# Patient Record
Sex: MALE | Race: OTHER | Employment: STUDENT | URBAN - METROPOLITAN AREA
[De-identification: names, ages, dates, MRNs, and addresses within clinical notes are randomized per-mention and may not be internally consistent; named-entity substitution may affect disease eponyms.]

---
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
## 2020-02-03 ENCOUNTER — OFFICE VISIT (OUTPATIENT)
Dept: FAMILY MEDICINE CLINIC | Facility: CLINIC | Age: 20
End: 2020-02-03
Payer: MEDICAID

## 2020-02-03 VITALS
BODY MASS INDEX: 33.12 KG/M2 | WEIGHT: 194 LBS | HEIGHT: 64 IN | DIASTOLIC BLOOD PRESSURE: 80 MMHG | HEART RATE: 75 BPM | TEMPERATURE: 98 F | SYSTOLIC BLOOD PRESSURE: 120 MMHG

## 2020-02-03 DIAGNOSIS — Z23 NEED FOR VACCINATION: ICD-10-CM

## 2020-02-03 DIAGNOSIS — F41.9 ANXIETY: ICD-10-CM

## 2020-02-03 DIAGNOSIS — J30.1 ALLERGIC RHINITIS DUE TO POLLEN, UNSPECIFIED SEASONALITY: ICD-10-CM

## 2020-02-03 DIAGNOSIS — Z00.00 ADULT GENERAL MEDICAL EXAM: Primary | ICD-10-CM

## 2020-02-03 PROCEDURE — 90686 IIV4 VACC NO PRSV 0.5 ML IM: CPT | Performed by: FAMILY MEDICINE

## 2020-02-03 PROCEDURE — 90471 IMMUNIZATION ADMIN: CPT | Performed by: FAMILY MEDICINE

## 2020-02-03 PROCEDURE — 99395 PREV VISIT EST AGE 18-39: CPT | Performed by: FAMILY MEDICINE

## 2020-02-03 RX ORDER — FLUTICASONE PROPIONATE 50 MCG
SPRAY, SUSPENSION (ML) NASAL
Qty: 3 BOTTLE | Refills: 1 | Status: SHIPPED | OUTPATIENT
Start: 2020-02-03 | End: 2020-06-12

## 2020-02-03 NOTE — PROGRESS NOTES
Patient ID: Candice Bueno. is a 23year old male. HPI  Patient presents with:  Routine Physical    Pt is single and does not smoke. He currently works as a plumbing apprentice. He regularly works out at [employer].     He finished his ph ALB 4.0 03/10/2012    GLOBULIN 3.1 03/10/2012    AGRATIO 1.3 03/10/2012     03/10/2012    K 4.5 03/10/2012     03/10/2012    CO2 25 03/10/2012       Lab Results   Component Value Date     (H) 03/10/2012    BUN 7 (L) 03/10/2012    AYLEEN stool.   Genitourinary: Negative for hematuria. Skin: Negative for color change. Allergic/Immunologic: Positive for environmental allergies. Neurological: Negative for speech difficulty and weakness.    Psychiatric/Behavioral: Negative for dysphoric m member of club or organization: Not on file        Attends meetings of clubs or organizations: Not on file        Relationship status: Not on file      Intimate partner violence:        Fear of current or ex partner: Not on file        Emotionally abused: Psychiatric: He has a normal mood and affect. Vitals reviewed. Blood pressure 138/76, pulse 75, temperature 97.8 °F (36.6 °C), temperature source Oral, height 5' 4\" (1.626 m), weight 194 lb (88 kg).    02/03/20  1409 02/03/20  1427   BP: 138/76 12

## 2020-02-06 ENCOUNTER — LAB ENCOUNTER (OUTPATIENT)
Dept: LAB | Age: 20
End: 2020-02-06
Attending: FAMILY MEDICINE
Payer: MEDICAID

## 2020-02-06 DIAGNOSIS — Z00.00 ADULT GENERAL MEDICAL EXAM: ICD-10-CM

## 2020-02-06 LAB
ALBUMIN SERPL-MCNC: 4.2 G/DL (ref 3.4–5)
ALBUMIN/GLOB SERPL: 1.4 {RATIO} (ref 1–2)
ALP LIVER SERPL-CCNC: 78 U/L (ref 45–117)
ALT SERPL-CCNC: 31 U/L (ref 16–61)
ANION GAP SERPL CALC-SCNC: 7 MMOL/L (ref 0–18)
AST SERPL-CCNC: 17 U/L (ref 15–37)
BASOPHILS # BLD AUTO: 0.03 X10(3) UL (ref 0–0.2)
BASOPHILS NFR BLD AUTO: 0.5 %
BILIRUB SERPL-MCNC: 0.5 MG/DL (ref 0.1–2)
BUN BLD-MCNC: 14 MG/DL (ref 7–18)
BUN/CREAT SERPL: 13.5 (ref 10–20)
CALCIUM BLD-MCNC: 9.1 MG/DL (ref 8.5–10.1)
CHLORIDE SERPL-SCNC: 107 MMOL/L (ref 98–112)
CHOLEST SMN-MCNC: 155 MG/DL (ref ?–200)
CO2 SERPL-SCNC: 26 MMOL/L (ref 21–32)
CREAT BLD-MCNC: 1.04 MG/DL (ref 0.7–1.3)
DEPRECATED RDW RBC AUTO: 41.3 FL (ref 35.1–46.3)
EOSINOPHIL # BLD AUTO: 0.12 X10(3) UL (ref 0–0.7)
EOSINOPHIL NFR BLD AUTO: 2.1 %
ERYTHROCYTE [DISTWIDTH] IN BLOOD BY AUTOMATED COUNT: 12.7 % (ref 11–15)
GLOBULIN PLAS-MCNC: 3.1 G/DL (ref 2.8–4.4)
GLUCOSE BLD-MCNC: 88 MG/DL (ref 70–99)
HCT VFR BLD AUTO: 48.4 % (ref 39–53)
HDLC SERPL-MCNC: 52 MG/DL (ref 40–59)
HGB BLD-MCNC: 16.3 G/DL (ref 13–17.5)
IMM GRANULOCYTES # BLD AUTO: 0.01 X10(3) UL (ref 0–1)
IMM GRANULOCYTES NFR BLD: 0.2 %
LDLC SERPL CALC-MCNC: 94 MG/DL (ref ?–100)
LYMPHOCYTES # BLD AUTO: 1.47 X10(3) UL (ref 1.5–5)
LYMPHOCYTES NFR BLD AUTO: 26.2 %
M PROTEIN MFR SERPL ELPH: 7.3 G/DL (ref 6.4–8.2)
MCH RBC QN AUTO: 29.9 PG (ref 26–34)
MCHC RBC AUTO-ENTMCNC: 33.7 G/DL (ref 31–37)
MCV RBC AUTO: 88.6 FL (ref 80–100)
MONOCYTES # BLD AUTO: 0.44 X10(3) UL (ref 0.1–1)
MONOCYTES NFR BLD AUTO: 7.8 %
NEUTROPHILS # BLD AUTO: 3.55 X10 (3) UL (ref 1.5–7.7)
NEUTROPHILS # BLD AUTO: 3.55 X10(3) UL (ref 1.5–7.7)
NEUTROPHILS NFR BLD AUTO: 63.2 %
NONHDLC SERPL-MCNC: 103 MG/DL (ref ?–130)
OSMOLALITY SERPL CALC.SUM OF ELEC: 290 MOSM/KG (ref 275–295)
PATIENT FASTING Y/N/NP: YES
PATIENT FASTING Y/N/NP: YES
PLATELET # BLD AUTO: 212 10(3)UL (ref 150–450)
POTASSIUM SERPL-SCNC: 4.2 MMOL/L (ref 3.5–5.1)
RBC # BLD AUTO: 5.46 X10(6)UL (ref 4.3–5.7)
SODIUM SERPL-SCNC: 140 MMOL/L (ref 136–145)
TRIGL SERPL-MCNC: 46 MG/DL (ref 30–149)
TSI SER-ACNC: 2.92 MIU/ML (ref 0.36–3.74)
VLDLC SERPL CALC-MCNC: 9 MG/DL (ref 0–30)
WBC # BLD AUTO: 5.6 X10(3) UL (ref 4–11)

## 2020-02-06 PROCEDURE — 80053 COMPREHEN METABOLIC PANEL: CPT

## 2020-02-06 PROCEDURE — 80061 LIPID PANEL: CPT

## 2020-02-06 PROCEDURE — 84443 ASSAY THYROID STIM HORMONE: CPT

## 2020-02-06 PROCEDURE — 36415 COLL VENOUS BLD VENIPUNCTURE: CPT

## 2020-02-06 PROCEDURE — 85025 COMPLETE CBC W/AUTO DIFF WBC: CPT

## 2020-03-21 ENCOUNTER — TELEPHONE (OUTPATIENT)
Dept: INTERNAL MEDICINE CLINIC | Facility: CLINIC | Age: 20
End: 2020-03-21

## 2020-03-21 NOTE — TELEPHONE ENCOUNTER
Spoke with Ambar from [facility] and was informed of Dr Lauren Stockton message, she stated she will send ref request to pt psychiatrist office.

## 2020-03-21 NOTE — TELEPHONE ENCOUNTER
This is a medicine that has been written by psychiatry. Why are the psychiatrist not refilling the patient's medications?

## 2020-03-23 NOTE — TELEPHONE ENCOUNTER
Current Outpatient Medications   • Citalopram Hydrobromide (CELEXA) 20 MG Oral Tab Take 20 mg by mouth nightly.     0

## 2020-03-24 RX ORDER — CITALOPRAM 20 MG/1
20 TABLET ORAL NIGHTLY
Qty: 90 TABLET | Refills: 1 | OUTPATIENT
Start: 2020-03-24

## 2020-03-24 NOTE — TELEPHONE ENCOUNTER
Per the patient the psychiatrist is refilling. He will call back if needed and schedule the appointment.

## 2020-03-24 NOTE — TELEPHONE ENCOUNTER
I stated on March 21, 2020 that this is a medication that was written by psychiatry. Why are we now being asked to refill this medication?   If for some reason it has to do with COVID-19 on the psychiatric office is closed or the psychiatrists are not doin

## 2020-04-09 DIAGNOSIS — H92.01 RIGHT EAR PAIN: ICD-10-CM

## 2020-04-09 DIAGNOSIS — H60.501 ACUTE OTITIS EXTERNA OF RIGHT EAR, UNSPECIFIED TYPE: ICD-10-CM

## 2020-04-09 RX ORDER — IBUPROFEN 600 MG/1
TABLET ORAL
Qty: 30 TABLET | Refills: 0 | Status: SHIPPED | OUTPATIENT
Start: 2020-04-09 | End: 2020-04-16

## 2020-04-10 DIAGNOSIS — J30.1 ALLERGIC RHINITIS DUE TO POLLEN: ICD-10-CM

## 2020-04-10 DIAGNOSIS — J30.1 ALLERGIC RHINITIS DUE TO POLLEN, UNSPECIFIED SEASONALITY: ICD-10-CM

## 2020-04-10 DIAGNOSIS — H91.93 HEARING DECREASED, BILATERAL: ICD-10-CM

## 2020-04-10 DIAGNOSIS — J34.3 HYPERTROPHY, NASAL, TURBINATE: ICD-10-CM

## 2020-04-10 RX ORDER — MONTELUKAST SODIUM 10 MG/1
TABLET ORAL
Qty: 90 TABLET | Refills: 1 | OUTPATIENT
Start: 2020-04-10

## 2020-04-16 DIAGNOSIS — H60.501 ACUTE OTITIS EXTERNA OF RIGHT EAR, UNSPECIFIED TYPE: ICD-10-CM

## 2020-04-16 DIAGNOSIS — H92.01 RIGHT EAR PAIN: ICD-10-CM

## 2020-04-16 RX ORDER — IBUPROFEN 600 MG/1
TABLET ORAL
Qty: 30 TABLET | Refills: 0 | Status: SHIPPED | OUTPATIENT
Start: 2020-04-16

## 2020-06-12 ENCOUNTER — TELEPHONE (OUTPATIENT)
Dept: FAMILY MEDICINE CLINIC | Facility: CLINIC | Age: 20
End: 2020-06-12

## 2020-06-12 DIAGNOSIS — J30.1 ALLERGIC RHINITIS DUE TO POLLEN, UNSPECIFIED SEASONALITY: ICD-10-CM

## 2020-06-12 RX ORDER — FLUTICASONE PROPIONATE 50 MCG
SPRAY, SUSPENSION (ML) NASAL
Qty: 3 BOTTLE | Refills: 1 | Status: SHIPPED | OUTPATIENT
Start: 2020-06-12 | End: 2022-09-27

## 2021-09-22 NOTE — PROGRESS NOTES
Diagnosis: Rupture of anterior cruciate ligament of right knee, initial encounter (J75.163M)  Posterolateral complex injury, right, initial encounter (N87.67WC)    Date of Onset / surgery: 12/23/2017        # of Visits:  6 MEDICAID         Next MD visit: 2
no

## 2021-11-09 ENCOUNTER — OFFICE VISIT (OUTPATIENT)
Dept: FAMILY MEDICINE CLINIC | Facility: CLINIC | Age: 21
End: 2021-11-09
Payer: COMMERCIAL

## 2021-11-09 VITALS
DIASTOLIC BLOOD PRESSURE: 83 MMHG | WEIGHT: 240 LBS | BODY MASS INDEX: 40.97 KG/M2 | HEART RATE: 67 BPM | SYSTOLIC BLOOD PRESSURE: 134 MMHG | HEIGHT: 64 IN

## 2021-11-09 DIAGNOSIS — B07.0 PLANTAR WARTS: Primary | ICD-10-CM

## 2021-11-09 PROCEDURE — 3079F DIAST BP 80-89 MM HG: CPT | Performed by: NURSE PRACTITIONER

## 2021-11-09 PROCEDURE — 3008F BODY MASS INDEX DOCD: CPT | Performed by: NURSE PRACTITIONER

## 2021-11-09 PROCEDURE — 99213 OFFICE O/P EST LOW 20 MIN: CPT | Performed by: NURSE PRACTITIONER

## 2021-11-09 PROCEDURE — 3075F SYST BP GE 130 - 139MM HG: CPT | Performed by: NURSE PRACTITIONER

## 2021-11-09 NOTE — PATIENT INSTRUCTIONS
Understanding Plantar Warts    A plantar wart is a small, noncancerous growth on the bottom of the foot. Plantar warts often develop where friction or pressure occurs, such as on the ball of the foot. The word plantar refers to the sole of the foot.  Laya parts of the wart before using other treatments.    When should I call my healthcare provider? Call your healthcare provider if you have plantar warts that become too painful and don't go away on their own or with over-the-counter and at-home treatments.

## 2021-11-10 NOTE — PROGRESS NOTES
HPI  Pt here for skin lesion on cuticles of fingers. Has had for a while-skin is dry, thickened and cracked. Review of Systems   Constitutional: Negative for activity change. Skin: Positive for color change and wound.         11/09/21  1101   BP: 134/ Sexual Activity      Alcohol use: No      Drug use: No      Sexual activity: Not on file    Other Topics      Concerns:        Grew up on a farm: Not Asked        History of tanning: Not Asked        Outdoor occupation: Not Asked        Reaction to local a Hydrobromide (CELEXA) 20 MG Oral Tab Take 20 mg by mouth nightly. 0       Allergies:    Amoxicillin             RASH  Sertraline              RESTLESSNESS    Physical Exam  Vitals and nursing note reviewed.    Constitutional:       General: He is not in

## 2021-11-10 NOTE — ASSESSMENT & PLAN NOTE
Due to number of plantar warts, referred to derm  Cimetidine 800 mg bid     Please call if symptoms worsen or are not resolving.

## 2021-11-15 ENCOUNTER — OFFICE VISIT (OUTPATIENT)
Dept: DERMATOLOGY CLINIC | Facility: CLINIC | Age: 21
End: 2021-11-15
Payer: COMMERCIAL

## 2021-11-15 DIAGNOSIS — B07.9 VERRUCA(E): Primary | ICD-10-CM

## 2021-11-15 PROCEDURE — 17110 DESTRUCTION B9 LES UP TO 14: CPT | Performed by: DERMATOLOGY

## 2021-11-15 PROCEDURE — 99213 OFFICE O/P EST LOW 20 MIN: CPT | Performed by: DERMATOLOGY

## 2021-11-15 RX ORDER — IMIQUIMOD 12.5 MG/.25G
CREAM TOPICAL
Qty: 24 EACH | Refills: 1 | Status: SHIPPED | OUTPATIENT
Start: 2021-11-15

## 2021-11-22 NOTE — PROGRESS NOTES
Yesy Beverly. is a 24year old male. Patient presents with:  Derm Problem: No hx of skinc ca. LOV 11/1/19.  [facility] visit referred to derm for possible wart/fungus and itchiness on right thumb, middle and index nail and left middle fing • Citalopram Hydrobromide (CELEXA) 20 MG Oral Tab Take 20 mg by mouth nightly.     0     Allergies:     Amoxicillin             RASH  Sertraline              RESTLESSNESS    Past Medical History:   Diagnosis Date   • ADD (attention deficit disorder)    • for possible wart/fungus and itchiness on right thumb, middle and index nail and left middle finger nail. Has been using cimetidine for 6 days per [facility] physcian recommendations, pt has seen minimal improvement.     Past notes/ records and appropriate/relevan discussed application instructions rationale. May take 3 to 4 months to see significant improvement    Dose of cimetidine extremely high will decrease to 40 mg twice daily. Dose per kilogram is for pediatric patients.   Max dose 900mg/day in adult    Disc

## 2021-12-18 ENCOUNTER — OFFICE VISIT (OUTPATIENT)
Dept: DERMATOLOGY CLINIC | Facility: CLINIC | Age: 21
End: 2021-12-18

## 2021-12-18 DIAGNOSIS — B07.9 VERRUCA(E): Primary | ICD-10-CM

## 2021-12-18 DIAGNOSIS — Z51.81 MEDICATION MONITORING ENCOUNTER: ICD-10-CM

## 2021-12-18 PROCEDURE — 99213 OFFICE O/P EST LOW 20 MIN: CPT | Performed by: DERMATOLOGY

## 2022-01-02 NOTE — PROGRESS NOTES
Jay Lipscomb. is a 24year old male. Patient presents with:  Warts: LOV 11/15/21. Patient for f/u of warts to right thumb. Patient happy with results. Warts resolved. Continues to apple imiquimod q nightly and taking cimetide. 30 tablet 0   • CloNIDine HCl 0.1 MG Oral Tab TK 1 T PO QHS (Patient not taking: Reported on 12/18/2021)  0   • Citalopram Hydrobromide (CELEXA) 20 MG Oral Tab Take 20 mg by mouth nightly.    (Patient not taking: Reported on 12/18/2021)  0     Allergies: Macular degeneration Neg                       HPI :      Patient presents with:  Warts: LOV 11/15/21. Patient for f/u of warts to right thumb. Patient happy with results. Warts resolved. Continues to apple imiquimod q nightly and taking cimetide.      Past refills if needed of the Aldara. The fact this may recur to reviewed. Discussed adding heat with heating pad on high 3 minutes 3-4 times weekly.     Await response to cryo patient tolerated well recheck at follow-up in approximately 1 month if needed

## 2022-09-21 ENCOUNTER — NURSE TRIAGE (OUTPATIENT)
Dept: FAMILY MEDICINE CLINIC | Facility: CLINIC | Age: 22
End: 2022-09-21

## 2022-09-23 ENCOUNTER — APPOINTMENT (OUTPATIENT)
Dept: GENERAL RADIOLOGY | Age: 22
End: 2022-09-23
Attending: PHYSICIAN ASSISTANT

## 2022-09-23 ENCOUNTER — HOSPITAL ENCOUNTER (OUTPATIENT)
Age: 22
Discharge: HOME | End: 2022-09-23

## 2022-09-23 VITALS
DIASTOLIC BLOOD PRESSURE: 75 MMHG | RESPIRATION RATE: 22 BRPM | OXYGEN SATURATION: 99 % | SYSTOLIC BLOOD PRESSURE: 127 MMHG | TEMPERATURE: 97 F | HEART RATE: 57 BPM

## 2022-09-23 DIAGNOSIS — M25.562 ACUTE PAIN OF LEFT KNEE: ICD-10-CM

## 2022-09-23 DIAGNOSIS — M70.50 BURSITIS OF KNEE, UNSPECIFIED BURSA, UNSPECIFIED LATERALITY: Primary | ICD-10-CM

## 2022-09-23 PROCEDURE — 99213 OFFICE O/P EST LOW 20 MIN: CPT | Performed by: PHYSICIAN ASSISTANT

## 2022-09-23 PROCEDURE — 73560 X-RAY EXAM OF KNEE 1 OR 2: CPT | Performed by: PHYSICIAN ASSISTANT

## 2022-09-23 PROCEDURE — A6449 LT COMPRES BAND >=3" <5"/YD: HCPCS | Performed by: PHYSICIAN ASSISTANT

## 2022-09-23 RX ORDER — IBUPROFEN 600 MG/1
TABLET ORAL
Qty: 20 TABLET | Refills: 0 | Status: SHIPPED | OUTPATIENT
Start: 2022-09-23 | End: 2022-09-27

## 2022-09-23 NOTE — ED INITIAL ASSESSMENT (HPI)
Pt here w c/o L knee swelling/pain x a week and 2 days after having to knee on it for 2 hrs while at work. State having pain with kneeling and pushing on it.

## 2022-09-27 ENCOUNTER — OFFICE VISIT (OUTPATIENT)
Dept: FAMILY MEDICINE CLINIC | Facility: CLINIC | Age: 22
End: 2022-09-27

## 2022-09-27 VITALS
HEIGHT: 64 IN | DIASTOLIC BLOOD PRESSURE: 63 MMHG | SYSTOLIC BLOOD PRESSURE: 111 MMHG | HEART RATE: 62 BPM | WEIGHT: 224 LBS | BODY MASS INDEX: 38.24 KG/M2

## 2022-09-27 DIAGNOSIS — M25.562 ACUTE PAIN OF LEFT KNEE: Primary | ICD-10-CM

## 2022-09-27 PROCEDURE — 3078F DIAST BP <80 MM HG: CPT | Performed by: NURSE PRACTITIONER

## 2022-09-27 PROCEDURE — 3008F BODY MASS INDEX DOCD: CPT | Performed by: NURSE PRACTITIONER

## 2022-09-27 PROCEDURE — 99213 OFFICE O/P EST LOW 20 MIN: CPT | Performed by: NURSE PRACTITIONER

## 2022-09-27 PROCEDURE — 3074F SYST BP LT 130 MM HG: CPT | Performed by: NURSE PRACTITIONER

## 2022-09-27 RX ORDER — NAPROXEN 500 MG/1
500 TABLET ORAL 2 TIMES DAILY WITH MEALS
Qty: 60 TABLET | Refills: 0 | Status: SHIPPED | OUTPATIENT
Start: 2022-09-27 | End: 2022-09-27

## 2022-09-28 ENCOUNTER — OFFICE VISIT (OUTPATIENT)
Dept: ORTHOPEDICS CLINIC | Facility: CLINIC | Age: 22
End: 2022-09-28

## 2022-09-28 VITALS — BODY MASS INDEX: 38.24 KG/M2 | HEIGHT: 64 IN | WEIGHT: 224 LBS

## 2022-09-28 DIAGNOSIS — M70.42 BURSITIS, PREPATELLAR, LEFT: Primary | ICD-10-CM

## 2022-09-28 PROCEDURE — 3008F BODY MASS INDEX DOCD: CPT | Performed by: PHYSICIAN ASSISTANT

## 2022-09-28 PROCEDURE — 99213 OFFICE O/P EST LOW 20 MIN: CPT | Performed by: PHYSICIAN ASSISTANT

## 2022-09-28 RX ORDER — MELOXICAM 15 MG/1
15 TABLET ORAL DAILY
Qty: 30 TABLET | Refills: 0 | Status: SHIPPED | OUTPATIENT
Start: 2022-09-28

## 2022-09-28 NOTE — ASSESSMENT & PLAN NOTE
Small effusion present  Will have pt see ortho to see if draining of effusion is needed  Ice 20 min 4-6 times per day  Knee compression sleeve. Naproxen 500 mg twice a day as needed w food  Refer ortho  Please call if symptoms worsen or are not resolving.

## 2022-10-03 ENCOUNTER — OFFICE VISIT (OUTPATIENT)
Dept: ORTHOPEDICS CLINIC | Facility: CLINIC | Age: 22
End: 2022-10-03
Payer: COMMERCIAL

## 2022-10-03 VITALS — HEIGHT: 64 IN | WEIGHT: 224 LBS | BODY MASS INDEX: 38.24 KG/M2

## 2022-10-03 DIAGNOSIS — M70.42 BURSITIS, PREPATELLAR, LEFT: Primary | ICD-10-CM

## 2022-10-03 PROCEDURE — 99212 OFFICE O/P EST SF 10 MIN: CPT | Performed by: PHYSICIAN ASSISTANT

## 2022-10-03 PROCEDURE — 3008F BODY MASS INDEX DOCD: CPT | Performed by: PHYSICIAN ASSISTANT

## 2022-10-03 NOTE — PROGRESS NOTES
[facility] Progress Note      Chief Complaint:  Left knee swelling      Subjective: Chen Ryder. is a 25year old male who is here for reevaluation of his left knee. He was seen by me last week and was diagnosed with prepatellar bursitis. He is a  and he has been off of work since last week through the rest of this week. He notes improvements in swelling and decreasing pain and tenderness. He is here to review return to work next week and his restrictions. He has no other complaints. Objective: He ambulates with a nonantalgic gait. Exam of the left knee and lower extremity reveals minimal prepatellar swelling. There is no erythema or warmth. He has no pain with flexion and extension of the knee. Sensation is present to light touch. Assessment: Resolving left knee prepatellar bursitis      Plan: Overall, his symptoms continue to improve. He is requesting a return to work note with restrictions when he goes back next week. He is a  and does a lot of kneeling. I advised that he limit his kneeling, squatting, and repetitive crouching. A work note stating he is able to return with no rodding, lifting and carrying to tolerance and no restriction in walking or driving was given. This will be for a month. We will follow-up in 1 month for recheck in hopes of return to full duty. He will call with any other questions or concerns in the interim.       Chen Ryder PA-C  [facility] Orthopedic Surgery

## 2022-10-04 ENCOUNTER — TELEPHONE (OUTPATIENT)
Dept: FAMILY MEDICINE CLINIC | Facility: CLINIC | Age: 22
End: 2022-10-04

## 2022-10-04 NOTE — TELEPHONE ENCOUNTER
Patient is scheduled with Dr. Philip Palm on 10/06 for a px. Orders will be placed during office visit. No further action required.

## 2022-10-04 NOTE — TELEPHONE ENCOUNTER
Spoke, with mother and informed her of the message below. Mother did schedule an appt for the patient to see Dr. Thor Booth on 10-6-22 at 10:00 a res 24 was used. This was per the message below.

## 2022-10-04 NOTE — TELEPHONE ENCOUNTER
Per mom of patient, patient would like to come for his physical for work purposes and needs it as soon as possible, can we used \"res24\" due to the earliest available is on 12/20/2022. Please advise, Thank you.     Please reply to pool: [facility] IM FM ALG RHE

## 2022-10-04 NOTE — TELEPHONE ENCOUNTER
Spoke, with the mother/Anabel (on hippa) and was informed of the message below. Mother, states that the patient is off this week from work and has to go back to work on Monday 10-10-22. Mother, would like to know if the pt can be seen this week.

## 2022-10-04 NOTE — TELEPHONE ENCOUNTER
Mom Anabel(on hipaa) indicated that patient's employer needs titers for hepatitis B and booster if needed also needed to get an updated tetanus shot. Scheduled a nurse visit for 10/6/2022 at 11am in [address] [place] [address].     Marci transferred mom to phone room to schedule physical.

## 2022-10-06 ENCOUNTER — OFFICE VISIT (OUTPATIENT)
Dept: FAMILY MEDICINE CLINIC | Facility: CLINIC | Age: 22
End: 2022-10-06
Payer: COMMERCIAL

## 2022-10-06 ENCOUNTER — LAB ENCOUNTER (OUTPATIENT)
Dept: LAB | Age: 22
End: 2022-10-06
Attending: FAMILY MEDICINE
Payer: COMMERCIAL

## 2022-10-06 VITALS
TEMPERATURE: 97 F | HEIGHT: 64 IN | WEIGHT: 226 LBS | SYSTOLIC BLOOD PRESSURE: 130 MMHG | DIASTOLIC BLOOD PRESSURE: 84 MMHG | BODY MASS INDEX: 38.58 KG/M2 | HEART RATE: 72 BPM

## 2022-10-06 DIAGNOSIS — Z00.00 ADULT GENERAL MEDICAL EXAM: ICD-10-CM

## 2022-10-06 DIAGNOSIS — Z23 IMMUNIZATION DUE: ICD-10-CM

## 2022-10-06 DIAGNOSIS — Z00.00 ADULT GENERAL MEDICAL EXAM: Primary | ICD-10-CM

## 2022-10-06 DIAGNOSIS — J30.1 SEASONAL ALLERGIC RHINITIS DUE TO POLLEN: ICD-10-CM

## 2022-10-06 DIAGNOSIS — E66.01 SEVERE OBESITY (BMI 35.0-35.9 WITH COMORBIDITY) (HCC): ICD-10-CM

## 2022-10-06 LAB
ALBUMIN SERPL-MCNC: 4.2 G/DL (ref 3.4–5)
ALBUMIN/GLOB SERPL: 1.3 {RATIO} (ref 1–2)
ALP LIVER SERPL-CCNC: 94 U/L
ALT SERPL-CCNC: 42 U/L
ANION GAP SERPL CALC-SCNC: 7 MMOL/L (ref 0–18)
AST SERPL-CCNC: 17 U/L (ref 15–37)
BASOPHILS # BLD AUTO: 0.03 X10(3) UL (ref 0–0.2)
BASOPHILS NFR BLD AUTO: 0.4 %
BILIRUB SERPL-MCNC: 0.5 MG/DL (ref 0.1–2)
BUN BLD-MCNC: 14 MG/DL (ref 7–18)
BUN/CREAT SERPL: 13.3 (ref 10–20)
CALCIUM BLD-MCNC: 9.6 MG/DL (ref 8.5–10.1)
CHLORIDE SERPL-SCNC: 108 MMOL/L (ref 98–112)
CHOLEST SERPL-MCNC: 191 MG/DL (ref ?–200)
CO2 SERPL-SCNC: 25 MMOL/L (ref 21–32)
CREAT BLD-MCNC: 1.05 MG/DL
DEPRECATED RDW RBC AUTO: 41.8 FL (ref 35.1–46.3)
EOSINOPHIL # BLD AUTO: 0.13 X10(3) UL (ref 0–0.7)
EOSINOPHIL NFR BLD AUTO: 1.9 %
ERYTHROCYTE [DISTWIDTH] IN BLOOD BY AUTOMATED COUNT: 12.5 % (ref 11–15)
FASTING PATIENT LIPID ANSWER: YES
FASTING STATUS PATIENT QL REPORTED: YES
GFR SERPLBLD BASED ON 1.73 SQ M-ARVRAT: 103 ML/MIN/1.73M2 (ref 60–?)
GLOBULIN PLAS-MCNC: 3.2 G/DL (ref 2.8–4.4)
GLUCOSE BLD-MCNC: 95 MG/DL (ref 70–99)
HBV SURFACE AB SER QL: REACTIVE
HBV SURFACE AB SERPL IA-ACNC: 11.46 MIU/ML
HCT VFR BLD AUTO: 49.9 %
HDLC SERPL-MCNC: 45 MG/DL (ref 40–59)
HGB BLD-MCNC: 16.4 G/DL
IMM GRANULOCYTES # BLD AUTO: 0.03 X10(3) UL (ref 0–1)
IMM GRANULOCYTES NFR BLD: 0.4 %
LDLC SERPL CALC-MCNC: 134 MG/DL (ref ?–100)
LYMPHOCYTES # BLD AUTO: 1.59 X10(3) UL (ref 1–4)
LYMPHOCYTES NFR BLD AUTO: 23 %
MCH RBC QN AUTO: 29.8 PG (ref 26–34)
MCHC RBC AUTO-ENTMCNC: 32.9 G/DL (ref 31–37)
MCV RBC AUTO: 90.6 FL
MONOCYTES # BLD AUTO: 0.52 X10(3) UL (ref 0.1–1)
MONOCYTES NFR BLD AUTO: 7.5 %
NEUTROPHILS # BLD AUTO: 4.62 X10 (3) UL (ref 1.5–7.7)
NEUTROPHILS # BLD AUTO: 4.62 X10(3) UL (ref 1.5–7.7)
NEUTROPHILS NFR BLD AUTO: 66.8 %
NONHDLC SERPL-MCNC: 146 MG/DL (ref ?–130)
OSMOLALITY SERPL CALC.SUM OF ELEC: 290 MOSM/KG (ref 275–295)
PLATELET # BLD AUTO: 240 10(3)UL (ref 150–450)
POTASSIUM SERPL-SCNC: 4.5 MMOL/L (ref 3.5–5.1)
PROT SERPL-MCNC: 7.4 G/DL (ref 6.4–8.2)
RBC # BLD AUTO: 5.51 X10(6)UL
SODIUM SERPL-SCNC: 140 MMOL/L (ref 136–145)
TRIGL SERPL-MCNC: 67 MG/DL (ref 30–149)
TSI SER-ACNC: 2.42 MIU/ML (ref 0.36–3.74)
VLDLC SERPL CALC-MCNC: 12 MG/DL (ref 0–30)
WBC # BLD AUTO: 6.9 X10(3) UL (ref 4–11)

## 2022-10-06 PROCEDURE — 3008F BODY MASS INDEX DOCD: CPT | Performed by: FAMILY MEDICINE

## 2022-10-06 PROCEDURE — 90715 TDAP VACCINE 7 YRS/> IM: CPT | Performed by: FAMILY MEDICINE

## 2022-10-06 PROCEDURE — 86706 HEP B SURFACE ANTIBODY: CPT

## 2022-10-06 PROCEDURE — 84443 ASSAY THYROID STIM HORMONE: CPT

## 2022-10-06 PROCEDURE — 3075F SYST BP GE 130 - 139MM HG: CPT | Performed by: FAMILY MEDICINE

## 2022-10-06 PROCEDURE — 80061 LIPID PANEL: CPT

## 2022-10-06 PROCEDURE — 90471 IMMUNIZATION ADMIN: CPT | Performed by: FAMILY MEDICINE

## 2022-10-06 PROCEDURE — 80053 COMPREHEN METABOLIC PANEL: CPT

## 2022-10-06 PROCEDURE — 36415 COLL VENOUS BLD VENIPUNCTURE: CPT

## 2022-10-06 PROCEDURE — 99395 PREV VISIT EST AGE 18-39: CPT | Performed by: FAMILY MEDICINE

## 2022-10-06 PROCEDURE — 85025 COMPLETE CBC W/AUTO DIFF WBC: CPT

## 2022-10-06 PROCEDURE — 3079F DIAST BP 80-89 MM HG: CPT | Performed by: FAMILY MEDICINE

## 2022-11-09 ENCOUNTER — OFFICE VISIT (OUTPATIENT)
Dept: ORTHOPEDICS CLINIC | Facility: CLINIC | Age: 22
End: 2022-11-09
Payer: COMMERCIAL

## 2022-11-09 VITALS — BODY MASS INDEX: 38.58 KG/M2 | WEIGHT: 226 LBS | HEIGHT: 64 IN

## 2022-11-09 DIAGNOSIS — M70.42 BURSITIS, PREPATELLAR, LEFT: Primary | ICD-10-CM

## 2022-11-09 PROCEDURE — 3008F BODY MASS INDEX DOCD: CPT | Performed by: PHYSICIAN ASSISTANT

## 2022-11-09 PROCEDURE — 99212 OFFICE O/P EST SF 10 MIN: CPT | Performed by: PHYSICIAN ASSISTANT

## 2022-11-09 NOTE — PROGRESS NOTES
[facility] Progress Note      Chief Complaint:  Left knee swelling      Subjective: Claudetta John. is a 25year old male who is here for reevaluation of his left knee. He is a  and has been seen in the past for his prepatellar bursitis. He has been working with restrictions over the past month including no rodding, stooping, squatting, or crouching and walking and driving with no limitations. Overall, he feels that the swelling and pain has resolved. He denies any swelling about the knee and no pain. Objective: Exam of the left knee and lower extremity reveals that the overlying skin is intact. He does have a trace amount of prepatellar swelling otherwise no effusion. No tenderness over the anterior aspect of the knee. Sensation is present to light touch. Assessment: Resolving left knee prepatellar bursitis      Plan: I explained that his prepatellar swelling has nearly resolved. He is interested in going back to work without restrictions except for limiting writing to tolerance. This is reasonable and a work note was given. I did explain that due to his job as a  and being on his knees, his symptoms can recur. I advised wearing kneepads and avoiding kneeling as much as he can. He will observe for any recurrence of symptoms and follow-up in 4 to 6 weeks only as needed. He will call with any other questions or concerns.         Suzanne Curtis PA-C  Orthopedic Surgery   [facility] Orthopaedic Surgery  [address]   t: [phone]  f: [phone]

## 2022-12-02 ENCOUNTER — TELEPHONE (OUTPATIENT)
Dept: ORTHOPEDICS CLINIC | Facility: CLINIC | Age: 22
End: 2022-12-02

## 2023-07-12 ENCOUNTER — OFFICE VISIT (OUTPATIENT)
Dept: FAMILY MEDICINE CLINIC | Facility: CLINIC | Age: 23
End: 2023-07-12

## 2023-07-12 ENCOUNTER — HOSPITAL ENCOUNTER (OUTPATIENT)
Dept: GENERAL RADIOLOGY | Age: 23
Discharge: HOME | End: 2023-07-12
Attending: PHYSICIAN ASSISTANT
Payer: COMMERCIAL

## 2023-07-12 VITALS
HEART RATE: 61 BPM | HEIGHT: 64 IN | WEIGHT: 224 LBS | BODY MASS INDEX: 38.24 KG/M2 | SYSTOLIC BLOOD PRESSURE: 126 MMHG | DIASTOLIC BLOOD PRESSURE: 81 MMHG

## 2023-07-12 DIAGNOSIS — M25.512 ACUTE PAIN OF LEFT SHOULDER: Primary | ICD-10-CM

## 2023-07-12 DIAGNOSIS — M25.512 ACUTE PAIN OF LEFT SHOULDER: ICD-10-CM

## 2023-07-12 PROCEDURE — 3079F DIAST BP 80-89 MM HG: CPT | Performed by: PHYSICIAN ASSISTANT

## 2023-07-12 PROCEDURE — 3074F SYST BP LT 130 MM HG: CPT | Performed by: PHYSICIAN ASSISTANT

## 2023-07-12 PROCEDURE — 3008F BODY MASS INDEX DOCD: CPT | Performed by: PHYSICIAN ASSISTANT

## 2023-07-12 PROCEDURE — 99213 OFFICE O/P EST LOW 20 MIN: CPT | Performed by: PHYSICIAN ASSISTANT

## 2023-07-12 PROCEDURE — 73030 X-RAY EXAM OF SHOULDER: CPT | Performed by: PHYSICIAN ASSISTANT

## 2023-07-12 RX ORDER — NAPROXEN 500 MG/1
500 TABLET ORAL 2 TIMES DAILY PRN
Qty: 60 TABLET | Refills: 0 | Status: SHIPPED | OUTPATIENT
Start: 2023-07-12

## 2023-07-13 ENCOUNTER — TELEPHONE (OUTPATIENT)
Dept: FAMILY MEDICINE CLINIC | Facility: CLINIC | Age: 23
End: 2023-07-13

## 2023-07-13 NOTE — TELEPHONE ENCOUNTER
----- Message From: Mu Pierre PA-C Sent: 7/13/2023 12:55 PM CDT---    X-ray of left shoulder showed mild osteoarthritis. Please continue with pain relief.

## 2023-08-16 RX ORDER — NAPROXEN 500 MG/1
500 TABLET ORAL 2 TIMES DAILY PRN
Qty: 60 TABLET | Refills: 3 | Status: SHIPPED | OUTPATIENT
Start: 2023-08-16

## 2023-08-16 NOTE — TELEPHONE ENCOUNTER
Refill passed per [facility] protocol, however, last prescribed for :      Acute pain of left shoulder    -  Primary      Relevant Medications     naproxen 500 MG Oral Tab     Please advise on refill. Thanks.     Requested Prescriptions   Pending Prescriptions Disp Refills    NAPROXEN 500 MG Oral Tab [Pharmacy Med Name: NAPROXEN 500MG TABLETS] 60 tablet 0     Sig: TAKE 1 TABLET(500 MG) BY MOUTH TWICE DAILY AS NEEDED       Non-Narcotic Pain Medication Protocol Passed - 8/15/2023  5:28 PM        Passed - In person appointment or virtual visit in the past 6 mos or appointment in next 3 mos     Recent Outpatient Visits              1 month ago Acute pain of left shoulder    [facility], [address], [place], [place]    Office Visit    9 months ago Bursitis, prepatellar, left    [facility], [address], [place], PA-C    Office Visit    10 months ago Adult general medical exam    [address], [address], [address], [place], [place]    Office Visit    10 months ago Bursitis, prepatellar, left [facility], [address], [place], PA-C    Office Visit    10 months ago Bursitis, prepatellar, left    [facility], [address], [place], PA-C    Office Visit                         Recent Outpatient Visits              1 month ago Acute pain of left shoulder    [facility], [address], [place], [place]    Office Visit    9 months ago Bursitis, prepatellar, left [facility], [address], [place], PA-C    Office Visit    10 months ago Adult general medical exam    [facility], [address], [place], [place]    Office Visit    10 months ago Bursitis, prepatellar, [facility] [address], [place], [place] Office Visit    10 months ago Bursitis, prepatellar, left    [facility], [address], [place], PA-C    Office Visit

## 2024-10-05 NOTE — PROGRESS NOTES
HPI:    Patient ID: Bryan Rush is a 16year old male. Pt has had vomiting/ diarrhea symptoms for 1 days. No fevers and no sig abdominal pains. No suspicious ingestions but uncle has similar symptoms on Sunday.   Pt is recovering well from his ACL pepe adequate fluids; zofran as needed for nausea; To call if worse or not better; Follow up as needed. Note for school provided. No orders of the defined types were placed in this encounter.       Meds This Visit:  Signed Prescriptions Disp Refills    On English

## 2025-05-23 NOTE — PROGRESS NOTES
Patient ID: [place] Pain is a 25year old male. HPI  Patient presents with:  Hair/Scalp Problem: follow up hair loss left eyebrow  Pt is in office with mother    Pt is experiencing hair loss on lateral aspect of left eyebrow.  He states the Rogaine an Spiritual Care Visit  Spiritual Care Request    Reason for Visit:  Continue Visiting: Yes  Crisis Visit: Code Blue   Request Received From:  Referral From: Verbal  Focus of Care:  Visited With: Patient   Care Provided for Crisis Visit: Family not present   Refer to :  Referral To:    Outcome:  Other health caregiver(s) were attending to the pt.  There will be another visit scheduled.    Chaplain Brannon Wooten           Anxiety state, unspecified    • Asthma    • Esophageal reflux     Per NG:  GERD, clinical       Past Surgical History:   Procedure Laterality Date   • KNEE ARTHROSCOPY ACL RECONSTRUCTION Right 12/23/2017    Performed by Dixon Cushing, MD at [address] [place] [address] Patient is alert and oriented to person, place, and time. Psychiatric: Patient has a normal mood and affect.     02/28/19  1500 02/28/19  1502 02/28/19  1511   BP: 139/78 140/67 126/70   BP Location: Right arm Right arm    Patient Position: Sitting Sittin personal performance and is accurate and complete.   Teresa Dorsey DO, 2/28/2019, 4:56 PM

## (undated) DEVICE — SUTURE VICRYL 0 CP-1

## (undated) DEVICE — SOL  .9 3000ML

## (undated) DEVICE — KIT ASCP FX ALL INSD ACL STRL

## (undated) DEVICE — DRAPE SRG 90X60IN BCK TBL CVR

## (undated) DEVICE — KIT ACL TRANS TIB HALL SAW BLD

## (undated) DEVICE — NEEDLE HPO 18GA 1.5IN ECLPS

## (undated) DEVICE — WEBRIL COTTON UNDERCAST PADDING: Brand: WEBRIL

## (undated) DEVICE — BURR SHVR COOLCUT 13CM 4MM 8

## (undated) DEVICE — STERILE LATEX POWDER-FREE SURGICAL GLOVESWITH NITRILE COATING: Brand: PROTEXIS

## (undated) DEVICE — SUTURE FIBERWIRE S AR-7200

## (undated) DEVICE — SUCTION CANISTER, 3000CC,SAFELINER: Brand: DEROYAL

## (undated) DEVICE — SUTURE FIBERLOOP #2

## (undated) DEVICE — MEDI-VAC NON-CONDUCTIVE SUCTION TUBING: Brand: CARDINAL HEALTH

## (undated) DEVICE — BRACE ORTH LNG MED 26IN 18-22

## (undated) DEVICE — 3M™ STERI-STRIP™ REINFORCED ADHESIVE SKIN CLOSURES, R1547, 1/2 IN X 4 IN (12 MM X 100 MM), 6 STRIPS/ENVELOPE: Brand: 3M™ STERI-STRIP™

## (undated) DEVICE — SUTURE ETHILON 3-0 669H

## (undated) DEVICE — Device

## (undated) DEVICE — CHLORAPREP 26ML APPLICATOR

## (undated) DEVICE — SUTURE FIBERLINK FBRWR 2 26IN

## (undated) DEVICE — PRECISION (9.0 X 0.51 X 31.0MM)

## (undated) DEVICE — PROXIMATE SKIN STAPLERS (35 WIDE) CONTAINS 35 STAINLESS STEEL STAPLES (FIXED HEAD): Brand: PROXIMATE

## (undated) DEVICE — TOWEL OR BLU 16X26 STRL

## (undated) DEVICE — LOWER EXTREMITY: Brand: MEDLINE INDUSTRIES, INC.

## (undated) DEVICE — 60 ML SYRINGE LUER-LOCK TIP: Brand: MONOJECT

## (undated) DEVICE — BATTERY

## (undated) DEVICE — ZIMMER® STERILE DISPOSABLE TOURNIQUET CUFF WITH PLC, DUAL PORT, SINGLE BLADDER, 30 IN. (76 CM)

## (undated) DEVICE — NEEDLE SPINAL 18X3-1/2 PINK.

## (undated) DEVICE — POLAR CARE CUBE COOLING UNIT

## (undated) DEVICE — DRAPE SHEET LG

## (undated) DEVICE — REPROCESS TOURN 60-7070-105

## (undated) DEVICE — BLADE SHVR COOLCUT 13CM 4MM

## (undated) DEVICE — COTTON UNDERCAST PADDING,REGULAR FINISH: Brand: WEBRIL

## (undated) DEVICE — TUBING IRR 16FT CNT WV 3 ASCP

## (undated) DEVICE — STERILE TETRA-FLEX CF, ELASTIC BANDAGE, 4" X 5.5YD: Brand: TETRA-FLEX™CF

## (undated) DEVICE — AMBIENT SUPER TURBOVAC 90 IFS: Brand: COBLATION

## (undated) DEVICE — BRACE ORTH LNG LG 26IN 22-27IN

## (undated) DEVICE — ABDOMINAL PAD: Brand: CURITY

## (undated) DEVICE — PAD THRP 16IN WRPON MU LNG STM

## (undated) DEVICE — SUTURE MONOCRYL 4-0 Y845G

## (undated) DEVICE — SUTURE VICRYL 2-0 FS-1

## (undated) DEVICE — ENCORE® LATEX ACCLAIM SIZE 8, STERILE LATEX POWDER-FREE SURGICAL GLOVE: Brand: ENCORE

## (undated) NOTE — LETTER
3/25/2019          To Whom It May Concern: Rinku Mcdonald. is currently under my medical care. Please excuse him from gym for the rest of the school year. If you require additional information please contact our office.         Since

## (undated) NOTE — LETTER
2/18/2019          To Whom It May Concern: Serina Smith is currently under my medical care. Please excuse him for the remainder of the school year from physical education and sporting activities.     If you require additional information please cont

## (undated) NOTE — LETTER
11/9/2017              Romeo Fernandes        [address] [facility] [address]         To whom it may concern,    Jarad Powell is currently a patient under my medical care. Patient was evaluated for his right knee injury.   The exam is

## (undated) NOTE — LETTER
1/23/2018          To Whom It May Concern: Pat Steinberg is currently under my medical care. Please excuse the patient from school missed as he has been ill. May return to school when well.       If you require additional information please contact o

## (undated) NOTE — LETTER
To Whom It May Concern: [address]. is currently under my medical care for his left knee and is able to return to work on Monday, October 10th with restrictions including no rodding, able to lift and carry to tolerance, no restriction in walking or driving. These restrictions will be in effect for 1 month. He will follow up at that time in hopes of return to work full duty. If you require additional information please contact our office.       Sincerely,      Alex Soares PA-C  [facility] - ORTHOPEDICS  [address] [place] [id number]  [phone]        Document generated by:  Alex Soares PA-C

## (undated) NOTE — LETTER
3/11/2019          To Whom It May Concern: Camacho Mckenna. is currently under my medical care. Erica Alexander was in my office this morning. Erica Alexander will be having surgery to his knee tomorrow.     If you require additional information ple

## (undated) NOTE — LETTER
[address] [facility]  Dept: [phone]  Dept Fax: [phone]: [phone]      September 7, 2017    Patient: Katrina Leach   Date of Visit: 9/7/2017       To Whom It May Concern:     Mela San

## (undated) NOTE — Clinical Note
State [facility] [employer] of Child Health Examination       Student's Name  Jerry's Birth Anuj Title                           Date    (If adding dates to the above immunization history section, put your initials by date(s) and sign here.)   ALTERNATIVE PROOF OF IMMUNITY   1 Diagnosis of asthma? Child wakes during the night coughing   Yes   No    Yes   No    Loss of function of one of paired organs? (eye/ear/kidney/testicle)   Yes   No      Birth Defects? Developmental delay? Yes   No    Yes   No  Hospitalizations? When? Signs of Insulin Resistance (hypertension, dyslipidemia, polycystic ovarian syndrome, acanthosis nigricans)                           At Risk  NO   Lead Risk Questionnaire  Req'd for children 6 months thru 6 yrs enrolled in licensed or public school ope Needs/Restrictions     None   SPECIAL INSTRUCTIONS/DEVICES e.g. safety glasses, glass eye, chest protector for arrhythmia, pacemaker, prosthetic device, dental bridge, false teeth, athleticsupport/cup     None   MENTAL HEALTH/OTHER   Is there anything else

## (undated) NOTE — LETTER
To Whom It May Concern: [address] is currently under my medical care for his left knee and is able to continue working with limiting rodding to tolerance, otherwise no restrictions. He will follow up in 4-6 weeks as needed. If you require additional information please contact our office.       Sincerely,      Jurgen Mccray PA-C  [facility] - ORTHOPEDICS  [address] [id number]  [phone]        Document generated by:  Jurgen Mccray PA-C

## (undated) NOTE — LETTER
[address] [facility]  Dept: [phone]  Dept Fax: [phone]: [phone]      November 1, 2017    Patient: Kandice Grey   Date of Visit: 11/1/2017       To Whom It May Concern:     Zak Ramirez

## (undated) NOTE — MR AVS SNAPSHOT
[address]  [address] [facility]  [phone]               Thank you for choosing us for your health care visit with Jia Flores DO.   We are glad to serve you and happy to provide you with this summary visit, view other health information and more. To sign up or find more information on getting   Proxy Access to your child’s MyChart go to [email or web address] and click on the   Sign Up Forms link in the Additional Information box on the right.

## (undated) NOTE — LETTER
State of [address] [place] [address] [employer] of Child Health Examination       Student's Name  Ree Sierraville Birth Da Title                           Date     Signature 12th Grade   HEALTH HISTORY          TO BE COMPLETED AND SIGNED BY PARENT/GUARDIAN AND VERIFIED BY HEALTH CARE PROVIDER    ALLERGIES  (Food, drug, insect, other) MEDICATION  (List all prescribed or taken on a regular basis.)     Diagnosis of asthma?   Child Wt 201 lb (91.2 kg)   BMI 34.50 kg/m²     DIABETES SCREENING  BMI>85% age/sex  No And any two of the following:  Family History No   Ethnic Minority  No          Signs of Insulin Resistance (hypertension, dyslipidemia, polycystic ovarian syndrome, acanth Quick-relief  medication (e.g. Short Acting Beta Antagonist): No          Controller medication (e.g. inhaled corticosteroid):   No Other   NEEDS/MODIFICATIONS required in the school setting  None DIETARY Needs/Restrictions     None   SPECIAL INSTR

## (undated) NOTE — LETTER
2/11/2019          To Whom It May Concern: [place] Pain is currently under my medical care. Please excuse him from gym and physical education until further notice. Allow use of crutches and elevator use if available.   If you require additional infor

## (undated) NOTE — LETTER
[address] [facility] 5  Dept: [phone]  Dept Fax: [phone]  Loc: [phone]      August 28, 2017    Patient: Bryan Rush   Date of Visit: 8/28/2017       To Whom It May Concern:     Ab Andrade

## (undated) NOTE — LETTER
[address] [place] [address]  Dept: [phone]  Dept Fax: [phone]  Loc: [phone]      August 30, 2017    Patient: Serina Smith   Date of Visit: 8/30/2017       To Whom It May Concern:     Noam Ceballos

## (undated) NOTE — LETTER
12/6/2017              Romeo Fernandes        [address] [facility] [address]         To whom it may concern,    Jarad Osuna is currently a patient under my medical care. Patient will be having surgery for his right knee after a wrestling injury. He will not be able to participate in gym until cleared by his orthopedic surgeon. .  If you require additional information please do not hesitate to contact my office.         Sincerely,      Hugh Gross DO  [facility], [address], [address], [address] [address],[address]  [address] [facility]  [phone]        Document electronically generated by:  Hugh Gross

## (undated) NOTE — LETTER
Date & Time: 2/8/2019, 2:38 PM  Patient: Olivia Barrientos  Encounter Provider(s):    On File, CRYSTAL SOUSA Attending  SHANNON Morales       To Whom It May Concern: Olivia Barrientos was seen and treated in our department on 2/8/2019.  He should not participa

## (undated) NOTE — LETTER
Name:  Moni Reis Year:  12th Grade Class: Student ID No.:   Address:  [place] [address] Phone:  [phone] (home)  : [id number] 25year old   Name Relationship [address] Work Phone Home Phone Mobile Phone   1.  VA check all that apply: N/A No   9. Has a doctor ever ordered a test for your heart? For example, ECG/EKG. Echocardiogram) No   10. Do you get lightheaded or feel more short of breath than expected during exercise? No   11.  Have you ever had an unexplained 26. Do you cough, wheeze, or have difficulty breathing during or after exercise? No   27. Have you ever used an inhaler or taken asthma medication? No   28. Is there anyone in your family who has asthma? No   29.  Were you born without or are you missing a 55. How many periods have you had in the last 12 months? Explain \"yes\" answers here:   ____________________________________            I hereby state that, to the best of my knowledge, my answers to the above questions are complete and correct.  1/21/2 participation in interscholastic sports for 395 days from this date.    Limited:No                                                                    Examination Date: 1/21/2019   Additional Comments:         [de-identified] Signature       Physician Assistant [email or web address]             Signature of student-athlete Date Signature of parent-guardian Date        ©2010 AAFP, AAP, [address] [facility], [facility]. for

## (undated) NOTE — ED AVS SNAPSHOT
Johnnie Klein   MRN: [id number]    Department:  [facility] Emergency Department   Date of Visit:  2/8/2019           Disclosure     Insurance plans vary and the physician(s) referred by the ER may not be covered by your plan.  Please contact yo CARE PHYSICIAN AT ONCE OR RETURN IMMEDIATELY TO THE EMERGENCY DEPARTMENT. If you have been prescribed any medication(s), please fill your prescription right away and begin taking the medication(s) as directed.   If you believe that any of the medications

## (undated) NOTE — Clinical Note
VACCINE ADMINISTRATION RECORD  PARENT / GUARDIAN APPROVAL  Date: 2017  Vaccine administered to:  Olivia Barrientos     : 2000    MRN: [id number]    A copy of the appropriate Centers for Disease Control and Prevention Vaccine Information statement

## (undated) NOTE — LETTER
1/10/2018          To Whom It May Concern: Renu Isaacs is currently under my medical care and may not return to school on 1/11/2018.     Activity is restricted as follows:   No physical education  Elevator use if available  Handicap type parking if av

## (undated) NOTE — LETTER
2/26/2018              Romeo Fernandes        [address] [facility]         To Whom it may concern:     This is to certify that Kandice Grey had an appointment on 2/26/2018 with Jus Finney MD.        Sincerely,    Anders Staples

## (undated) NOTE — LETTER
12/18/2021 [address] [facility]       To Whom It May Concern,  Neeraj Adler was seen in the Dermatology Office today. Please excuse him for today.   He may return to work on [employer]

## (undated) NOTE — LETTER
11/15/2017          To Whom It May Concern: Salvatore Odell is currently under my medical care. Please excuse the patient from school missed as he has had a knee injury. May return to school on Monday, 11/20/17.   If you require additional information p

## (undated) NOTE — MR AVS SNAPSHOT
[address], [address]  [address] [facility]  [phone]               Thank you for choosing us for your health care visit with Arthur Peguero DO.   We are glad to serve you and happy to provide you with this summary Acute bilateral knee pain    -  Primary    Right elbow pain          Instructions and Information about Your Health     None      Allergies as of Mar 17, 2017     Amoxicillin Rash                Today's Vital Signs     BP Pulse    137/61 mmHg (98 %, Z = 2 These medications were sent to [address], [address], [phone], [address] [facility], [place]      Phone:  [phone]    - naproxen 500 MG Tabs o playing a game of tag  o cooking healthy meals together  o creating a rainbow shopping list to find colorful fruits and vegetables  o go on a walking scavenger hunt through the neighborhood   o grow a family garden    In addition to 5, 4, 3, 2, 1 familie

## (undated) NOTE — LETTER
3/15/2019          To Whom It May Concern: Wojciech Simran. is currently under my medical care. He had surgery with me on 3/12/2019. Please excuse him from school for 2 weeks from surgery.      If you require additional information plea